# Patient Record
Sex: FEMALE | Race: WHITE | NOT HISPANIC OR LATINO | URBAN - METROPOLITAN AREA
[De-identification: names, ages, dates, MRNs, and addresses within clinical notes are randomized per-mention and may not be internally consistent; named-entity substitution may affect disease eponyms.]

---

## 2024-03-19 ENCOUNTER — OFFICE VISIT (OUTPATIENT)
Dept: OBSTETRICS AND GYNECOLOGY | Facility: CLINIC | Age: 34
End: 2024-03-19
Payer: COMMERCIAL

## 2024-03-19 ENCOUNTER — LAB (OUTPATIENT)
Dept: LAB | Facility: LAB | Age: 34
End: 2024-03-19
Payer: COMMERCIAL

## 2024-03-19 VITALS
DIASTOLIC BLOOD PRESSURE: 72 MMHG | SYSTOLIC BLOOD PRESSURE: 128 MMHG | BODY MASS INDEX: 41.12 KG/M2 | WEIGHT: 262 LBS | HEIGHT: 67 IN

## 2024-03-19 DIAGNOSIS — N93.9 ABNORMAL UTERINE BLEEDING (AUB): ICD-10-CM

## 2024-03-19 DIAGNOSIS — Z12.4 CERVICAL CANCER SCREENING: ICD-10-CM

## 2024-03-19 LAB
DHEA-S SERPL-MCNC: 134 UG/DL (ref 12–379)
ESTRADIOL SERPL-MCNC: 35 PG/ML
FSH SERPL-ACNC: 6.5 IU/L
PROLACTIN SERPL-MCNC: 9.2 UG/L (ref 3–20)
TSH SERPL-ACNC: 1.26 MIU/L (ref 0.44–3.98)

## 2024-03-19 PROCEDURE — 88142 CYTOPATH C/V THIN LAYER: CPT

## 2024-03-19 PROCEDURE — 1036F TOBACCO NON-USER: CPT | Performed by: OBSTETRICS & GYNECOLOGY

## 2024-03-19 PROCEDURE — 84443 ASSAY THYROID STIM HORMONE: CPT

## 2024-03-19 PROCEDURE — 99203 OFFICE O/P NEW LOW 30 MIN: CPT | Performed by: OBSTETRICS & GYNECOLOGY

## 2024-03-19 PROCEDURE — 36415 COLL VENOUS BLD VENIPUNCTURE: CPT

## 2024-03-19 PROCEDURE — 84146 ASSAY OF PROLACTIN: CPT

## 2024-03-19 PROCEDURE — 82627 DEHYDROEPIANDROSTERONE: CPT

## 2024-03-19 PROCEDURE — 84402 ASSAY OF FREE TESTOSTERONE: CPT

## 2024-03-19 PROCEDURE — 82670 ASSAY OF TOTAL ESTRADIOL: CPT

## 2024-03-19 PROCEDURE — 83001 ASSAY OF GONADOTROPIN (FSH): CPT

## 2024-03-19 PROCEDURE — 87624 HPV HI-RISK TYP POOLED RSLT: CPT

## 2024-03-19 ASSESSMENT — PAIN SCALES - GENERAL: PAINLEVEL: 0-NO PAIN

## 2024-03-19 NOTE — PROGRESS NOTES
Assessment   Teresita Brito is a 33 y.o. female  presenting for abnormal uterine bleeding and irregular menstrual cycle. She had several months since October 2024 with either no period or abnormal uterine bleeding potentially related to ovulatory dysregulation. She does not show clinical signs of elevated androgens including hirsutism or acne, however, labs will be obtained to determine if she meets criteria for a PCOS diagnosis. In addition, she has a history of an endometrial biopsy showing a disorder endometrium. Further assessment of the endometrium lining by ultrasound is indicated to assess for structural abnormalities related to the AUB. The patient is no currently interested in hormonal therapy for cycle regulation at this time due to her cycle returning to baseline over the past month.     PLAN  1. Cervical cancer screening  - THINPREP PAP TEST  2. Transvaginal ultrasound. The patient will be messaged with the results.   3. Obtain TSH, FSH, Prolactin, and Testosterone   4. Follow-up visit in 1 year or sooner as needed.    Stanford Garvey, MS3      Subjective     Teresita Brito is a 33 y.o. female who is here for an annual exam.     APE Concerns: The patient presents with concerns due to irregular menstrual cycle. She states her typical menstrual cycle is 30 to 34 days with 6 to 7 days of bleeding. She describes the bleeding as moderate amount with no soaking through pads. She stated that she had a normal menstrual cycle in October 2023. She then did not have a period in November. From the end of December through January 2024, she stated that she had spotting or bleeding everyday of the month. Her cycle in February 2024 returned to normal but was heavier requiring thicker pads to control the bleeding. She states she has not had abnormal bleeding this month. She notes mild cramping pains on the first day of menses but denies any significant discomfort. She denies excessive hair growth or acne.     She has a  "history of abnormal menstrual bleeding in 12/2018 for which she underwent a transvaginal ultrasound and endometrial biopsy. The endometrial biopsy showed disordered proliferative endometrium with tubal metaplasia and focal glandular and stromal breakdown. The transvaginal ultrasound was completed in 02/2019 and showed no abnormalities but did indicate ovaries containing several follicles.     Gynecologic History:    OBHx: G0  Menses: Irregular menstrual cycle from November to January. Her normal cycle is 30 to 34 days with 6 to 7 days of bleeding.   Last Pap: Last pap smear was 2018.  HPV Vaccine: She is unsure if she has received the HPV vaccine.  History of Dysplasia: She has no history abnormal pap smears.   Sexually active: She is not sexually active.   STI testing: She has no history of STIs.   Contraception: She does not use any current contraception.   FamHx of GYN cancers:  No history of gynecologic cancers in her family.     PMH, PSH, FH, SH, medications and allergies reviewed and edited as needed.    Objective   /72   Ht 1.702 m (5' 7\")   Wt 119 kg (262 lb)   LMP 03/16/2024   BMI 41.04 kg/m²      General:   Alert and oriented, in no acute distress   Neck: Supple. No visible thyromegaly.    Breast/Axilla: Normal to palpation bilaterally without masses, skin changes, or nipple discharge.    Abdomen: Soft, non-tender, without masses or organomegaly   Vulva: Normal architecture without erythema, masses, or lesions.    Vagina: Normal mucosa without lesions, masses, or atrophy. No abnormal vaginal discharge.    Cervix: Normal without masses, lesions, or signs of cervicitis.    Uterus: Normal mobile, non-enlarged uterus    Adnexa: Normal without masses or lesions   Pelvic Floor No POP noted. No high tone pelvic floor   Psych Normal affect. Normal mood.       "

## 2024-03-19 NOTE — PROGRESS NOTES
Assessment   Teresita Brito is a 33 y.o. female  presenting for abnormal uterine bleeding and irregular menstrual cycle. She had several months since October 2023 with either no period or abnormal uterine bleeding potentially related to ovulatory dysregulation. She does not show clinical signs of elevated androgens including hirsutism or acne, however, labs will be obtained to determine if she meets criteria for a PCOS diagnosis. In addition, she has a history of an endometrial biopsy showing a disorder endometrium. Further assessment of the endometrium lining by ultrasound is indicated to assess for structural abnormalities related to the AUB. The patient is no currently interested in hormonal therapy for cycle regulation at this time due to her cycle returning to baseline over the past month.     PLAN  1. Cervical cancer screening  - THINPREP PAP TEST  2. Transvaginal ultrasound. The patient will be messaged with the results.   3. Obtain TSH, FSH, Prolactin, and Testosterone   4. Follow-up visit in 1 year or sooner as needed based on test results    Stanford Garvey, MS3    I saw and examined the patient.  I personally obtained the key and critical portions of the history and physical exam, and was physically present for the key and critical portions performed by the medical student.  I reviewed the medical student's documentation and made changes as needed in the body of the text.  I agreed with the medical student's medical decision-making as documented in the note.    Anamika Ocampo MD        Subjective     Teresita Brito is a 33 y.o. female who is here for an annual exam.     APE Concerns: The patient presents with concerns due to irregular menstrual cycle. She states her typical menstrual cycle is 30 to 34 days with 6 to 7 days of bleeding. She describes the bleeding as moderate amount with no soaking through pads. She stated that she had a normal menstrual cycle in October 2023. She then did not have a  "period in November. From the end of December through January 2024, she stated that she had spotting or bleeding everyday of the month. Her cycle in February 2024 returned to normal but was heavier requiring thicker pads to control the bleeding. She states she has not had abnormal bleeding this month. She notes mild cramping pains on the first day of menses but denies any significant discomfort. She denies excessive hair growth or acne.     She has a history of abnormal menstrual bleeding in 12/2018 for which she underwent a transvaginal ultrasound and endometrial biopsy. The endometrial biopsy showed disordered proliferative endometrium with tubal metaplasia and focal glandular and stromal breakdown. The transvaginal ultrasound was completed in 02/2019 and showed no abnormalities but did indicate ovaries containing several follicles.     Gynecologic History:    OBHx: G0  Menses: Irregular menstrual cycle from November to January. Her normal cycle is 30 to 34 days with 6 to 7 days of bleeding.   Last Pap: Last pap smear was 2018.  HPV Vaccine: She is unsure if she has received the HPV vaccine.  History of Dysplasia: She has no history abnormal pap smears.   Sexually active: She is not sexually active.   STI testing: She has no history of STIs.   Contraception: She does not use any current contraception.   FamHx of GYN cancers:  No history of gynecologic cancers in her family.     PMH, PSH, FH, SH, medications and allergies reviewed and edited as needed.    Objective   /72   Ht 1.702 m (5' 7\")   Wt 119 kg (262 lb)   LMP 03/16/2024   BMI 41.04 kg/m²      General:   Alert and oriented, in no acute distress   Neck: Supple. No visible thyromegaly.    Breast/Axilla: Normal to palpation bilaterally without masses, skin changes, or nipple discharge.    Abdomen: Soft, non-tender, without masses or organomegaly   Vulva: Normal architecture without erythema, masses, or lesions.    Vagina: Normal mucosa without " lesions, masses, or atrophy. No abnormal vaginal discharge.    Cervix: Normal without masses, lesions, or signs of cervicitis.    Uterus: Normal mobile, non-enlarged uterus    Adnexa: Normal without masses or lesions   Pelvic Floor No POP noted. No high tone pelvic floor   Psych Normal affect. Normal mood.

## 2024-03-24 LAB
TESTOSTERONE FREE (CHAN): 1.5 PG/ML (ref 0.1–6.4)
TESTOSTERONE,TOTAL,LC-MS/MS: 10 NG/DL (ref 2–45)

## 2024-03-25 ENCOUNTER — HOSPITAL ENCOUNTER (OUTPATIENT)
Dept: RADIOLOGY | Facility: CLINIC | Age: 34
Discharge: HOME | End: 2024-03-25
Payer: COMMERCIAL

## 2024-03-25 DIAGNOSIS — N93.9 ABNORMAL UTERINE BLEEDING (AUB): ICD-10-CM

## 2024-03-25 PROCEDURE — 76830 TRANSVAGINAL US NON-OB: CPT | Performed by: RADIOLOGY

## 2024-03-25 PROCEDURE — 76856 US EXAM PELVIC COMPLETE: CPT | Performed by: RADIOLOGY

## 2024-03-25 PROCEDURE — 76856 US EXAM PELVIC COMPLETE: CPT

## 2024-03-28 LAB
CYTOLOGY CMNT CVX/VAG CYTO-IMP: NORMAL
HPV HR 12 DNA GENITAL QL NAA+PROBE: NEGATIVE
HPV HR GENOTYPES PNL CVX NAA+PROBE: NEGATIVE
HPV16 DNA SPEC QL NAA+PROBE: NEGATIVE
HPV18 DNA SPEC QL NAA+PROBE: NEGATIVE
LAB AP HPV GENOTYPE QUESTION: YES
LAB AP HPV HR: NORMAL
LABORATORY COMMENT REPORT: NORMAL
LABORATORY COMMENT REPORT: NORMAL
LMP START DATE: NORMAL
PATH REPORT.TOTAL CANCER: NORMAL

## 2024-08-15 ENCOUNTER — APPOINTMENT (OUTPATIENT)
Dept: PRIMARY CARE | Facility: CLINIC | Age: 34
End: 2024-08-15
Payer: COMMERCIAL

## 2024-08-15 VITALS
HEART RATE: 70 BPM | WEIGHT: 218 LBS | OXYGEN SATURATION: 100 % | HEIGHT: 67 IN | SYSTOLIC BLOOD PRESSURE: 120 MMHG | BODY MASS INDEX: 34.21 KG/M2 | DIASTOLIC BLOOD PRESSURE: 72 MMHG

## 2024-08-15 DIAGNOSIS — H40.053 BILATERAL OCULAR HYPERTENSION: ICD-10-CM

## 2024-08-15 DIAGNOSIS — L65.9 HAIR LOSS: ICD-10-CM

## 2024-08-15 DIAGNOSIS — E66.09 CLASS 1 OBESITY DUE TO EXCESS CALORIES WITHOUT SERIOUS COMORBIDITY WITH BODY MASS INDEX (BMI) OF 31.0 TO 31.9 IN ADULT: ICD-10-CM

## 2024-08-15 DIAGNOSIS — Z00.00 ANNUAL PHYSICAL EXAM: Primary | ICD-10-CM

## 2024-08-15 PROCEDURE — 1036F TOBACCO NON-USER: CPT | Performed by: INTERNAL MEDICINE

## 2024-08-15 PROCEDURE — G0447 BEHAVIOR COUNSEL OBESITY 15M: HCPCS | Performed by: INTERNAL MEDICINE

## 2024-08-15 PROCEDURE — 99385 PREV VISIT NEW AGE 18-39: CPT | Performed by: INTERNAL MEDICINE

## 2024-08-15 PROCEDURE — 99203 OFFICE O/P NEW LOW 30 MIN: CPT | Performed by: INTERNAL MEDICINE

## 2024-08-15 PROCEDURE — 3008F BODY MASS INDEX DOCD: CPT | Performed by: INTERNAL MEDICINE

## 2024-08-15 NOTE — PROGRESS NOTES
Primary Care Physician: Jose Suresh MD    Date of Visit: 08/15/2024     Chief Complaint:   Chief Complaint   Patient presents with    Annual Exam       HPI:  HPI    Subjective:  Teresita Brito is a 34 y.o. female presents new pt here to [facility] care and annual exam  States that she has not seen a doctor in many yrs.  Has felt well.    No chronic medications.     Ocular hypertension ---recent dx.   Had eval with retinal specialist.  Does not have glaucoma.     Morbid obesity--wt from 273->218#.  Walking at least 3 miles daily.   Has noted hair loss since losing wt.  Dwp.  Good diet, but may not be getting enough protein.    Recommended that she increase protein and add biotin supplement.     No past medical history on file.     Family History   Problem Relation Name Age of Onset    Hypertension Mother      Heart disease Mother      Diabetes Father      Hypertension Father      Anxiety disorder Sister        Social History     Socioeconomic History    Marital status: Single   Tobacco Use    Smoking status: Never    Smokeless tobacco: Never   Vaping Use    Vaping status: Never Used   Substance and Sexual Activity    Alcohol use: Yes     Comment: occ    Drug use: Never    Sexual activity: Not Currently     No past surgical history on file.       ROS:   Review of Systems   Constitutional:  Negative for activity change, chills, fatigue, fever and unexpected weight change.   HENT:  Negative for congestion, dental problem, ear pain, sinus pressure, sinus pain, sore throat and trouble swallowing.    Eyes:  Negative for photophobia, discharge, redness and visual disturbance.   Respiratory:  Negative for cough, chest tightness, shortness of breath and wheezing.    Cardiovascular:  Negative for chest pain, palpitations and leg swelling.   Gastrointestinal:  Negative for abdominal pain, blood in stool, constipation, diarrhea, nausea and vomiting.   Endocrine: Negative for cold intolerance, heat intolerance, polydipsia,  "polyphagia and polyuria.   Genitourinary:  Negative for difficulty urinating, dysuria, flank pain, frequency and urgency.   Musculoskeletal:  Negative for arthralgias, joint swelling and myalgias.   Skin:  Negative for color change and rash.        Hair loss.   Allergic/Immunologic: Negative for environmental allergies, food allergies and immunocompromised state.   Neurological:  Negative for dizziness, weakness, light-headedness, numbness and headaches.   Hematological:  Does not bruise/bleed easily.   Psychiatric/Behavioral:  Negative for dysphoric mood and sleep disturbance. The patient is not nervous/anxious.         No anxiety.  No depression            Allergies:  No Known Allergies    Outpatient Medications:  No current outpatient medications    Vitals:  /72 (BP Location: Right arm, Patient Position: Sitting, BP Cuff Size: Adult)   Pulse 70   Ht 1.702 m (5' 7\")   Wt 98.9 kg (218 lb)   SpO2 100%   BMI 34.14 kg/m²   Wt Readings from Last 3 Encounters:   08/15/24 98.9 kg (218 lb)   03/19/24 119 kg (262 lb)   Dad dm 2, htn  Mom heart dz, htn    Physical Exam:  Physical Exam  Constitutional:       General: She is not in acute distress.     Appearance: Normal appearance. She is well-developed and well-groomed.   HENT:      Head: Normocephalic and atraumatic.      Right Ear: Tympanic membrane and ear canal normal.      Left Ear: Tympanic membrane and ear canal normal.      Nose: Nose normal.      Mouth/Throat:      Mouth: Mucous membranes are moist.      Pharynx: Oropharynx is clear.   Eyes:      Conjunctiva/sclera: Conjunctivae normal.      Pupils: Pupils are equal, round, and reactive to light.   Neck:      Thyroid: No thyromegaly.   Cardiovascular:      Rate and Rhythm: Normal rate and regular rhythm.      Heart sounds: Normal heart sounds, S1 normal and S2 normal. No murmur heard.  Pulmonary:      Effort: Pulmonary effort is normal.      Breath sounds: Normal breath sounds and air entry. No wheezing, " rhonchi or rales.   Abdominal:      General: Bowel sounds are normal. There is no distension.      Palpations: Abdomen is soft.      Tenderness: There is no abdominal tenderness. There is no guarding or rebound.   Musculoskeletal:         General: No swelling or tenderness. Normal range of motion.      Cervical back: Normal, full passive range of motion without pain, normal range of motion and neck supple.      Thoracic back: Normal.      Lumbar back: Normal.      Right lower leg: No edema.      Left lower leg: No edema.      Comments: Upper and lower extrems are wnl--inspection and palpation.   Strength is normal and symmetric.   Lymphadenopathy:      Cervical: No cervical adenopathy.   Skin:     General: Skin is warm and dry.      Findings: No bruising, erythema, lesion or rash.      Comments: Intact   Neurological:      General: No focal deficit present.      Mental Status: She is alert and oriented to person, place, and time.      Sensory: Sensation is intact.      Motor: Motor function is intact.      Deep Tendon Reflexes: Reflexes are normal and symmetric.   Psychiatric:         Mood and Affect: Mood and affect normal.         Speech: Speech normal.         Behavior: Behavior normal. Behavior is cooperative.           Assessment/Plan   Diagnoses and all orders for this visit:  Annual physical exam  -     CBC and Auto Differential; Future  -     Comprehensive Metabolic Panel; Future  -     Lipid Panel; Future  -     Urinalysis with Reflex Microscopic  -     Vitamin D 25-Hydroxy,Total (for eval of Vitamin D levels); Future  -     TSH with reflex to Free T4 if abnormal; Future  -     Hemoglobin A1C; Future  Bilateral ocular hypertension  Class 1 obesity due to excess calories without serious comorbidity with body mass index (BMI) of 31.0 to 31.9 in adult        Orders:  Orders Placed This Encounter   Procedures    CBC and Auto Differential    Comprehensive Metabolic Panel    Lipid Panel    Urinalysis with Reflex  Microscopic    Vitamin D 25-Hydroxy,Total (for eval of Vitamin D levels)    TSH with reflex to Free T4 if abnormal    Hemoglobin A1C      Followup Appts:  No future appointments.        ____________________________________________________________  Jose Suresh MD

## 2024-08-16 PROBLEM — Z00.00 ANNUAL PHYSICAL EXAM: Status: ACTIVE | Noted: 2024-08-16

## 2024-08-16 PROBLEM — E66.09 CLASS 1 OBESITY DUE TO EXCESS CALORIES WITHOUT SERIOUS COMORBIDITY WITH BODY MASS INDEX (BMI) OF 31.0 TO 31.9 IN ADULT: Status: ACTIVE | Noted: 2024-08-16

## 2024-08-16 PROBLEM — E66.811 CLASS 1 OBESITY DUE TO EXCESS CALORIES WITHOUT SERIOUS COMORBIDITY WITH BODY MASS INDEX (BMI) OF 31.0 TO 31.9 IN ADULT: Status: ACTIVE | Noted: 2024-08-16

## 2024-08-16 PROBLEM — H40.053 BILATERAL OCULAR HYPERTENSION: Status: ACTIVE | Noted: 2024-08-16

## 2024-08-16 ASSESSMENT — ENCOUNTER SYMPTOMS
DYSURIA: 0
BRUISES/BLEEDS EASILY: 0
VOMITING: 0
LIGHT-HEADEDNESS: 0
PHOTOPHOBIA: 0
ACTIVITY CHANGE: 0
MYALGIAS: 0
NUMBNESS: 0
CONSTIPATION: 0
FATIGUE: 0
FLANK PAIN: 0
EYE DISCHARGE: 0
TROUBLE SWALLOWING: 0
SORE THROAT: 0
ARTHRALGIAS: 0
FEVER: 0
DYSPHORIC MOOD: 0
COLOR CHANGE: 0
NAUSEA: 0
WEAKNESS: 0
WHEEZING: 0
SINUS PRESSURE: 0
UNEXPECTED WEIGHT CHANGE: 0
ABDOMINAL PAIN: 0
NERVOUS/ANXIOUS: 0
DIFFICULTY URINATING: 0
SINUS PAIN: 0
HEADACHES: 0
JOINT SWELLING: 0
PALPITATIONS: 0
CHEST TIGHTNESS: 0
BLOOD IN STOOL: 0
SLEEP DISTURBANCE: 0
EYE REDNESS: 0
CHILLS: 0
DIZZINESS: 0
ROS SKIN COMMENTS: HAIR LOSS.
SHORTNESS OF BREATH: 0
POLYDIPSIA: 0
FREQUENCY: 0
DIARRHEA: 0
COUGH: 0
POLYPHAGIA: 0

## 2024-08-17 ENCOUNTER — LAB (OUTPATIENT)
Dept: LAB | Facility: LAB | Age: 34
End: 2024-08-17
Payer: COMMERCIAL

## 2024-08-17 DIAGNOSIS — Z00.00 ANNUAL PHYSICAL EXAM: ICD-10-CM

## 2024-08-17 PROCEDURE — 81003 URINALYSIS AUTO W/O SCOPE: CPT

## 2024-08-17 PROCEDURE — 36415 COLL VENOUS BLD VENIPUNCTURE: CPT

## 2024-08-17 PROCEDURE — 84443 ASSAY THYROID STIM HORMONE: CPT

## 2024-08-17 PROCEDURE — 83036 HEMOGLOBIN GLYCOSYLATED A1C: CPT

## 2024-08-17 PROCEDURE — 82306 VITAMIN D 25 HYDROXY: CPT

## 2024-08-17 PROCEDURE — 80061 LIPID PANEL: CPT

## 2024-08-17 PROCEDURE — 85025 COMPLETE CBC W/AUTO DIFF WBC: CPT

## 2024-08-17 PROCEDURE — 80053 COMPREHEN METABOLIC PANEL: CPT

## 2024-08-18 LAB
25(OH)D3 SERPL-MCNC: 23 NG/ML (ref 30–100)
ALBUMIN SERPL BCP-MCNC: 4.5 G/DL (ref 3.4–5)
ALP SERPL-CCNC: 47 U/L (ref 33–110)
ALT SERPL W P-5'-P-CCNC: 11 U/L (ref 7–45)
ANION GAP SERPL CALC-SCNC: 16 MMOL/L (ref 10–20)
APPEARANCE UR: CLEAR
AST SERPL W P-5'-P-CCNC: 11 U/L (ref 9–39)
BASOPHILS # BLD AUTO: 0.03 X10*3/UL (ref 0–0.1)
BASOPHILS NFR BLD AUTO: 0.4 %
BILIRUB SERPL-MCNC: 0.8 MG/DL (ref 0–1.2)
BILIRUB UR STRIP.AUTO-MCNC: NEGATIVE MG/DL
BUN SERPL-MCNC: 12 MG/DL (ref 6–23)
CALCIUM SERPL-MCNC: 9.9 MG/DL (ref 8.6–10.6)
CHLORIDE SERPL-SCNC: 104 MMOL/L (ref 98–107)
CHOLEST SERPL-MCNC: 179 MG/DL (ref 0–199)
CHOLESTEROL/HDL RATIO: 4.1
CO2 SERPL-SCNC: 25 MMOL/L (ref 21–32)
COLOR UR: NORMAL
CREAT SERPL-MCNC: 0.85 MG/DL (ref 0.5–1.05)
EGFRCR SERPLBLD CKD-EPI 2021: >90 ML/MIN/1.73M*2
EOSINOPHIL # BLD AUTO: 0.05 X10*3/UL (ref 0–0.7)
EOSINOPHIL NFR BLD AUTO: 0.7 %
ERYTHROCYTE [DISTWIDTH] IN BLOOD BY AUTOMATED COUNT: 13.2 % (ref 11.5–14.5)
EST. AVERAGE GLUCOSE BLD GHB EST-MCNC: 94 MG/DL
GLUCOSE SERPL-MCNC: 86 MG/DL (ref 74–99)
GLUCOSE UR STRIP.AUTO-MCNC: NORMAL MG/DL
HBA1C MFR BLD: 4.9 %
HCT VFR BLD AUTO: 42.2 % (ref 36–46)
HDLC SERPL-MCNC: 43.3 MG/DL
HGB BLD-MCNC: 13.6 G/DL (ref 12–16)
IMM GRANULOCYTES # BLD AUTO: 0.02 X10*3/UL (ref 0–0.7)
IMM GRANULOCYTES NFR BLD AUTO: 0.3 % (ref 0–0.9)
KETONES UR STRIP.AUTO-MCNC: NEGATIVE MG/DL
LDLC SERPL CALC-MCNC: 120 MG/DL
LEUKOCYTE ESTERASE UR QL STRIP.AUTO: NEGATIVE
LYMPHOCYTES # BLD AUTO: 1.76 X10*3/UL (ref 1.2–4.8)
LYMPHOCYTES NFR BLD AUTO: 24.3 %
MCH RBC QN AUTO: 28.6 PG (ref 26–34)
MCHC RBC AUTO-ENTMCNC: 32.2 G/DL (ref 32–36)
MCV RBC AUTO: 89 FL (ref 80–100)
MONOCYTES # BLD AUTO: 0.34 X10*3/UL (ref 0.1–1)
MONOCYTES NFR BLD AUTO: 4.7 %
NEUTROPHILS # BLD AUTO: 5.03 X10*3/UL (ref 1.2–7.7)
NEUTROPHILS NFR BLD AUTO: 69.6 %
NITRITE UR QL STRIP.AUTO: NEGATIVE
NON HDL CHOLESTEROL: 136 MG/DL (ref 0–149)
NRBC BLD-RTO: 0 /100 WBCS (ref 0–0)
PH UR STRIP.AUTO: 6.5 [PH]
PLATELET # BLD AUTO: 227 X10*3/UL (ref 150–450)
POTASSIUM SERPL-SCNC: 4.4 MMOL/L (ref 3.5–5.3)
PROT SERPL-MCNC: 7.1 G/DL (ref 6.4–8.2)
PROT UR STRIP.AUTO-MCNC: NEGATIVE MG/DL
RBC # BLD AUTO: 4.76 X10*6/UL (ref 4–5.2)
RBC # UR STRIP.AUTO: NEGATIVE /UL
SODIUM SERPL-SCNC: 141 MMOL/L (ref 136–145)
SP GR UR STRIP.AUTO: 1.01
TRIGL SERPL-MCNC: 78 MG/DL (ref 0–149)
TSH SERPL-ACNC: 1.27 MIU/L (ref 0.44–3.98)
UROBILINOGEN UR STRIP.AUTO-MCNC: NORMAL MG/DL
VLDL: 16 MG/DL (ref 0–40)
WBC # BLD AUTO: 7.2 X10*3/UL (ref 4.4–11.3)

## 2025-08-22 ENCOUNTER — APPOINTMENT (OUTPATIENT)
Dept: PRIMARY CARE | Facility: CLINIC | Age: 35
End: 2025-08-22
Payer: COMMERCIAL

## 2025-08-22 VITALS
TEMPERATURE: 97.7 F | HEART RATE: 79 BPM | SYSTOLIC BLOOD PRESSURE: 126 MMHG | BODY MASS INDEX: 32.58 KG/M2 | HEIGHT: 68 IN | OXYGEN SATURATION: 100 % | DIASTOLIC BLOOD PRESSURE: 74 MMHG | WEIGHT: 215 LBS

## 2025-08-22 DIAGNOSIS — Z13.1 SCREENING FOR DIABETES MELLITUS: ICD-10-CM

## 2025-08-22 DIAGNOSIS — L65.9 HAIR LOSS: ICD-10-CM

## 2025-08-22 DIAGNOSIS — Z13.29 THYROID DISORDER SCREENING: ICD-10-CM

## 2025-08-22 DIAGNOSIS — Z13.220 LIPID SCREENING: ICD-10-CM

## 2025-08-22 DIAGNOSIS — Z00.00 ROUTINE GENERAL MEDICAL EXAMINATION AT A HEALTH CARE FACILITY: Primary | ICD-10-CM

## 2025-08-22 DIAGNOSIS — Z13.0 SCREENING, ANEMIA, DEFICIENCY, IRON: ICD-10-CM

## 2025-08-22 DIAGNOSIS — E55.9 VITAMIN D DEFICIENCY: ICD-10-CM

## 2025-08-22 PROBLEM — H40.059 OCULAR HYPERTENSION: Status: ACTIVE | Noted: 2024-08-01

## 2025-08-22 PROCEDURE — 99395 PREV VISIT EST AGE 18-39: CPT | Performed by: FAMILY MEDICINE

## 2025-08-22 PROCEDURE — 3008F BODY MASS INDEX DOCD: CPT | Performed by: FAMILY MEDICINE

## 2025-08-22 RX ORDER — METRONIDAZOLE 7.5 MG/G
1 LOTION TOPICAL 2 TIMES DAILY
COMMUNITY
Start: 2025-06-12

## 2025-08-22 ASSESSMENT — ENCOUNTER SYMPTOMS
CHILLS: 0
CONSTIPATION: 0
BLOOD IN STOOL: 0
FEVER: 0
VOMITING: 0
FREQUENCY: 0
SLEEP DISTURBANCE: 0
DIZZINESS: 0
ACTIVITY CHANGE: 0
HEADACHES: 0
ABDOMINAL PAIN: 0
SHORTNESS OF BREATH: 0
ARTHRALGIAS: 0
NUMBNESS: 0
DYSURIA: 0
TROUBLE SWALLOWING: 0
HEMATURIA: 0
APPETITE CHANGE: 0
DIARRHEA: 0
WEAKNESS: 0
NAUSEA: 0
LIGHT-HEADEDNESS: 0

## 2025-08-22 ASSESSMENT — PATIENT HEALTH QUESTIONNAIRE - PHQ9
2. FEELING DOWN, DEPRESSED OR HOPELESS: NOT AT ALL
1. LITTLE INTEREST OR PLEASURE IN DOING THINGS: NOT AT ALL
SUM OF ALL RESPONSES TO PHQ9 QUESTIONS 1 AND 2: 0

## 2026-08-24 ENCOUNTER — APPOINTMENT (OUTPATIENT)
Dept: PRIMARY CARE | Facility: CLINIC | Age: 36
End: 2026-08-24
Payer: COMMERCIAL